# Patient Record
Sex: FEMALE | Race: WHITE | URBAN - METROPOLITAN AREA
[De-identification: names, ages, dates, MRNs, and addresses within clinical notes are randomized per-mention and may not be internally consistent; named-entity substitution may affect disease eponyms.]

---

## 2018-01-18 ENCOUNTER — OUTPATIENT (OUTPATIENT)
Dept: OUTPATIENT SERVICES | Facility: HOSPITAL | Age: 38
LOS: 1 days | End: 2018-01-18
Payer: COMMERCIAL

## 2018-01-18 VITALS
SYSTOLIC BLOOD PRESSURE: 115 MMHG | HEIGHT: 61 IN | TEMPERATURE: 98 F | DIASTOLIC BLOOD PRESSURE: 77 MMHG | RESPIRATION RATE: 15 BRPM | WEIGHT: 121.03 LBS | HEART RATE: 70 BPM

## 2018-01-18 DIAGNOSIS — Z41.9 ENCOUNTER FOR PROCEDURE FOR PURPOSES OTHER THAN REMEDYING HEALTH STATE, UNSPECIFIED: Chronic | ICD-10-CM

## 2018-01-18 DIAGNOSIS — Z01.818 ENCOUNTER FOR OTHER PREPROCEDURAL EXAMINATION: ICD-10-CM

## 2018-01-18 DIAGNOSIS — Z64.1 PROBLEMS RELATED TO MULTIPARITY: ICD-10-CM

## 2018-01-18 LAB
ALBUMIN SERPL ELPH-MCNC: 3.5 G/DL — SIGNIFICANT CHANGE UP (ref 3.3–5)
ALP SERPL-CCNC: 66 U/L — SIGNIFICANT CHANGE UP (ref 40–120)
ALT FLD-CCNC: 25 U/L — SIGNIFICANT CHANGE UP (ref 12–78)
ANION GAP SERPL CALC-SCNC: 7 MMOL/L — SIGNIFICANT CHANGE UP (ref 5–17)
AST SERPL-CCNC: 20 U/L — SIGNIFICANT CHANGE UP (ref 15–37)
BILIRUB SERPL-MCNC: 0.3 MG/DL — SIGNIFICANT CHANGE UP (ref 0.2–1.2)
BUN SERPL-MCNC: 10 MG/DL — SIGNIFICANT CHANGE UP (ref 7–23)
CALCIUM SERPL-MCNC: 8.1 MG/DL — LOW (ref 8.5–10.1)
CHLORIDE SERPL-SCNC: 106 MMOL/L — SIGNIFICANT CHANGE UP (ref 96–108)
CO2 SERPL-SCNC: 29 MMOL/L — SIGNIFICANT CHANGE UP (ref 22–31)
CREAT SERPL-MCNC: 0.5 MG/DL — SIGNIFICANT CHANGE UP (ref 0.5–1.3)
GLUCOSE SERPL-MCNC: 77 MG/DL — SIGNIFICANT CHANGE UP (ref 70–99)
HCG SERPL-ACNC: <1 MIU/ML — SIGNIFICANT CHANGE UP
HCT VFR BLD CALC: 38.3 % — SIGNIFICANT CHANGE UP (ref 34.5–45)
HGB BLD-MCNC: 12.7 G/DL — SIGNIFICANT CHANGE UP (ref 11.5–15.5)
MCHC RBC-ENTMCNC: 28.6 PG — SIGNIFICANT CHANGE UP (ref 27–34)
MCHC RBC-ENTMCNC: 33.2 GM/DL — SIGNIFICANT CHANGE UP (ref 32–36)
MCV RBC AUTO: 86.2 FL — SIGNIFICANT CHANGE UP (ref 80–100)
PLATELET # BLD AUTO: 296 K/UL — SIGNIFICANT CHANGE UP (ref 150–400)
POTASSIUM SERPL-MCNC: 3.6 MMOL/L — SIGNIFICANT CHANGE UP (ref 3.5–5.3)
POTASSIUM SERPL-SCNC: 3.6 MMOL/L — SIGNIFICANT CHANGE UP (ref 3.5–5.3)
PROT SERPL-MCNC: 7 G/DL — SIGNIFICANT CHANGE UP (ref 6–8.3)
RBC # BLD: 4.44 M/UL — SIGNIFICANT CHANGE UP (ref 3.8–5.2)
RBC # FLD: 12.9 % — SIGNIFICANT CHANGE UP (ref 10.3–14.5)
SODIUM SERPL-SCNC: 142 MMOL/L — SIGNIFICANT CHANGE UP (ref 135–145)
WBC # BLD: 8.6 K/UL — SIGNIFICANT CHANGE UP (ref 3.8–10.5)
WBC # FLD AUTO: 8.6 K/UL — SIGNIFICANT CHANGE UP (ref 3.8–10.5)

## 2018-01-18 PROCEDURE — 86850 RBC ANTIBODY SCREEN: CPT

## 2018-01-18 PROCEDURE — 86901 BLOOD TYPING SEROLOGIC RH(D): CPT

## 2018-01-18 PROCEDURE — 93005 ELECTROCARDIOGRAM TRACING: CPT

## 2018-01-18 PROCEDURE — 86900 BLOOD TYPING SEROLOGIC ABO: CPT

## 2018-01-18 PROCEDURE — G0463: CPT

## 2018-01-18 PROCEDURE — 93010 ELECTROCARDIOGRAM REPORT: CPT | Mod: NC

## 2018-01-18 PROCEDURE — 85027 COMPLETE CBC AUTOMATED: CPT

## 2018-01-18 PROCEDURE — 84702 CHORIONIC GONADOTROPIN TEST: CPT

## 2018-01-18 PROCEDURE — 80053 COMPREHEN METABOLIC PANEL: CPT

## 2018-01-18 NOTE — H&P PST ADULT - NSANTHOSAYNRD_GEN_A_CORE
No. AMANDA screening performed.  STOP BANG Legend: 0-2 = LOW Risk; 3-4 = INTERMEDIATE Risk; 5-8 = HIGH Risk

## 2018-01-25 RX ORDER — SODIUM CHLORIDE 9 MG/ML
1000 INJECTION, SOLUTION INTRAVENOUS
Qty: 0 | Refills: 0 | Status: DISCONTINUED | OUTPATIENT
Start: 2018-01-26 | End: 2018-01-26

## 2018-01-25 RX ORDER — HYDROMORPHONE HYDROCHLORIDE 2 MG/ML
0.5 INJECTION INTRAMUSCULAR; INTRAVENOUS; SUBCUTANEOUS
Qty: 0 | Refills: 0 | Status: DISCONTINUED | OUTPATIENT
Start: 2018-01-26 | End: 2018-01-26

## 2018-01-25 RX ORDER — SODIUM CHLORIDE 9 MG/ML
1000 INJECTION, SOLUTION INTRAVENOUS
Qty: 0 | Refills: 0 | Status: DISCONTINUED | OUTPATIENT
Start: 2018-01-26 | End: 2018-02-10

## 2018-01-25 RX ORDER — MEPERIDINE HYDROCHLORIDE 50 MG/ML
12.5 INJECTION INTRAMUSCULAR; INTRAVENOUS; SUBCUTANEOUS ONCE
Qty: 0 | Refills: 0 | Status: DISCONTINUED | OUTPATIENT
Start: 2018-01-26 | End: 2018-01-26

## 2018-01-26 ENCOUNTER — TRANSCRIPTION ENCOUNTER (OUTPATIENT)
Age: 38
End: 2018-01-26

## 2018-01-26 ENCOUNTER — OUTPATIENT (OUTPATIENT)
Dept: OUTPATIENT SERVICES | Facility: HOSPITAL | Age: 38
LOS: 1 days | End: 2018-01-26
Payer: COMMERCIAL

## 2018-01-26 VITALS
RESPIRATION RATE: 18 BRPM | TEMPERATURE: 99 F | HEART RATE: 78 BPM | DIASTOLIC BLOOD PRESSURE: 74 MMHG | HEIGHT: 61 IN | SYSTOLIC BLOOD PRESSURE: 128 MMHG | OXYGEN SATURATION: 100 % | WEIGHT: 121.03 LBS

## 2018-01-26 VITALS
HEART RATE: 71 BPM | OXYGEN SATURATION: 100 % | SYSTOLIC BLOOD PRESSURE: 120 MMHG | DIASTOLIC BLOOD PRESSURE: 71 MMHG | RESPIRATION RATE: 16 BRPM

## 2018-01-26 DIAGNOSIS — Z64.1 PROBLEMS RELATED TO MULTIPARITY: ICD-10-CM

## 2018-01-26 DIAGNOSIS — Z41.9 ENCOUNTER FOR PROCEDURE FOR PURPOSES OTHER THAN REMEDYING HEALTH STATE, UNSPECIFIED: Chronic | ICD-10-CM

## 2018-01-26 LAB — HCG UR QL: NEGATIVE — SIGNIFICANT CHANGE UP

## 2018-01-26 PROCEDURE — 81025 URINE PREGNANCY TEST: CPT

## 2018-01-26 PROCEDURE — 58670 LAPAROSCOPY TUBAL CAUTERY: CPT

## 2018-01-26 RX ORDER — ACETAMINOPHEN 500 MG
1000 TABLET ORAL ONCE
Qty: 0 | Refills: 0 | Status: COMPLETED | OUTPATIENT
Start: 2018-01-26 | End: 2018-01-26

## 2018-01-26 RX ADMIN — SODIUM CHLORIDE 75 MILLILITER(S): 9 INJECTION, SOLUTION INTRAVENOUS at 09:15

## 2018-01-26 NOTE — ASU DISCHARGE PLAN (ADULT/PEDIATRIC). - DRIVING
Diarrhea  Diarrhea is watery poop (stool). It can make you feel weak, tired, thirsty, or give you a dry mouth (signs of dehydration). Watery poop is a sign of another problem, most often an infection. It often lasts 2-3 days. It can last longer if it is a sign of something serious. Take care of yourself as told by your doctor.  HOME CARE   · Drink 1 cup (8 ounces) of fluid each time you have watery poop.  · Do not drink the following fluids:    Those that contain simple sugars (fructose, glucose, galactose, lactose, sucrose, maltose).    Sports drinks.    Fruit juices.    Whole milk products.    Sodas.    Drinks with caffeine (coffee, tea, soda) or alcohol.  · Oral rehydration solution may be used if the doctor says it is okay. You may make your own solution. Follow this recipe:    ?-? teaspoon table salt.    ¾ teaspoon baking soda.    ? teaspoon salt substitute containing potassium chloride.    1 ? tablespoons sugar.    1 liter (34 ounces) of water.  · Avoid the following foods:    High fiber foods, such as raw fruits and vegetables.    Nuts, seeds, and whole grain breads and cereals.     Those that are sweetened with sugar alcohols (xylitol, sorbitol, mannitol).  · Try eating the following foods:    Starchy foods, such as rice, toast, pasta, low-sugar cereal, oatmeal, baked potatoes, crackers, and bagels.    Bananas.    Applesauce.  · Eat probiotic-rich foods, such as yogurt and milk products that are fermented.  · Wash your hands well after each time you have watery poop.  · Only take medicine as told by your doctor.  · Take a warm bath to help lessen burning or pain from having watery poop.  GET HELP RIGHT AWAY IF:   · You cannot drink fluids without throwing up (vomiting).  · You keep throwing up.  · You have blood in your poop, or your poop looks black and tarry.  · You do not pee (urinate) in 6-8 hours, or there is only a small amount of very dark pee.  · You have belly (abdominal) pain that gets worse or  stays in the same spot (localizes).  · You are weak, dizzy, confused, or light-headed.  · You have a very bad headache.  · Your watery poop gets worse or does not get better.  · You have a fever or lasting symptoms for more than 2-3 days.  · You have a fever and your symptoms suddenly get worse.  MAKE SURE YOU:   · Understand these instructions.  · Will watch your condition.  · Will get help right away if you are not doing well or get worse.     This information is not intended to replace advice given to you by your health care provider. Make sure you discuss any questions you have with your health care provider.     Document Released: 06/05/2009 Document Revised: 01/08/2016 Document Reviewed: 08/23/2016  [employer] Interactive Patient Education ©2016 [employer]  Nausea and Vomiting  Nausea means you feel sick to your stomach. Throwing up (vomiting) is a reflex where stomach contents come out of your mouth.  HOME CARE   · Take medicine as told by your doctor.  · Do not force yourself to eat. However, you do need to drink fluids.  · If you feel like eating, eat a normal diet as told by your doctor.    Eat rice, wheat, potatoes, bread, lean meats, yogurt, fruits, and vegetables.    Avoid high-fat foods.  · Drink enough fluids to keep your pee (urine) clear or pale yellow.  · Ask your doctor how to replace body fluid losses (rehydrate). Signs of body fluid loss (dehydration) include:    Feeling very thirsty.    Dry lips and mouth.    Feeling dizzy.    Dark pee.    Peeing less than normal.    Feeling confused.    Fast breathing or heart rate.  GET HELP RIGHT AWAY IF:   · You have blood in your throw up.  · You have black or bloody poop (stool).  · You have a bad headache or stiff neck.  · You feel confused.  · You have bad belly (abdominal) pain.  · You have chest pain or trouble breathing.  · You do not pee at least once every 8 hours.  · You have cold, clammy skin.  · You keep throwing up after 24 to 48 hours.  · You  have a fever.  MAKE SURE YOU:   · Understand these instructions.  · Will watch your condition.  · Will get help right away if you are not doing well or get worse.     This information is not intended to replace advice given to you by your health care provider. Make sure you discuss any questions you have with your health care provider.     Document Released: 06/05/2009 Document Revised: 03/11/2013 Document Reviewed: 08/23/2016  [employer] Interactive Patient Education ©2016 [employer]  Sinusitis, Adult  Sinusitis is redness, soreness, and puffiness (inflammation) of the air pockets in the bones of your face (sinuses). The redness, soreness, and puffiness can cause air and mucus to get trapped in your sinuses. This can allow germs to grow and cause an infection.   HOME CARE   · Drink enough fluids to keep your pee (urine) clear or pale yellow.  · Use a humidifier in your home.  · Run a hot shower to create steam in the bathroom. Sit in the bathroom with the door closed. Breathe in the steam 3-4 times a day.  · Put a warm, moist washcloth on your face 3-4 times a day, or as told by your doctor.  · Use salt water sprays (saline sprays) to wet the thick fluid in your nose. This can help the sinuses drain.  · Only take medicine as told by your doctor.  GET HELP RIGHT AWAY IF:   · Your pain gets worse.  · You have very bad headaches.  · You are sick to your stomach (nauseous).  · You throw up (vomit).  · You are very sleepy (drowsy) all the time.  · Your face is puffy (swollen).  · Your vision changes.  · You have a stiff neck.  · You have trouble breathing.  MAKE SURE YOU:   · Understand these instructions.  · Will watch your condition.  · Will get help right away if you are not doing well or get worse.     This information is not intended to replace advice given to you by your health care provider. Make sure you discuss any questions you have with your health care provider.    Medication and plan of care reviewed with  patient and teaching done on med reactions/side effects.  Rest, plenty of fluids, BRAT diet and increase as tolerated, humidifier, comfort measures and follow up with your PCP if symptoms persist.  If worse in any way go to urgent care or the ER today as discussed.     Document Released: 06/05/2009 Document Revised: 01/08/2016 Document Reviewed: 10/12/2016  [employer] Interactive Patient Education ©2016 [employer].     No

## 2018-01-26 NOTE — ASU DISCHARGE PLAN (ADULT/PEDIATRIC). - NOTIFY
Unable to Urinate/Pain not relieved by Medications/Bleeding that does not stop/Persistent Nausea and Vomiting/Fever greater than 101/GYN Fever>100.4 Numbness, color, or temperature change to extremity/GYN Fever>100.4/Bleeding that does not stop/Swelling that continues/Persistent Nausea and Vomiting/Unable to Urinate/Pain not relieved by Medications

## 2018-01-26 NOTE — ASU DISCHARGE PLAN (ADULT/PEDIATRIC). - ACTIVITY LEVEL
no exercise/no douching/no intercourse/no sports/gym/nothing per vagina/no tub baths/no tampons/no heavy lifting

## 2018-01-26 NOTE — ASU DISCHARGE PLAN (ADULT/PEDIATRIC). - MEDICATION SUMMARY - MEDICATIONS TO TAKE
I will START or STAY ON the medications listed below when I get home from the hospital:    ibuprofen 600 mg oral tablet  -- 1 tab(s) by mouth every 6 hours  -- Indication: For Pain med    hydroCHLOROthiazide 25 mg oral tablet  -- 1 tab(s) by mouth once a day  -- Indication: For home med

## 2018-01-26 NOTE — BRIEF OPERATIVE NOTE - OPERATION/FINDINGS
Normal sized anteverted uterus. Omental adhesions to anterior pelvic wall. Normal appearing bilateral tubes and ovaries.

## 2018-01-26 NOTE — BRIEF OPERATIVE NOTE - PROCEDURE
<<-----Click on this checkbox to enter Procedure Tubal ligation or cauterization, laparoscopic  01/26/2018  bilateral tubal ligation  Active  [email or web address]

## 2018-02-09 ENCOUNTER — TRANSCRIPTION ENCOUNTER (OUTPATIENT)
Age: 38
End: 2018-02-09

## 2018-07-17 PROBLEM — I10 ESSENTIAL (PRIMARY) HYPERTENSION: Chronic | Status: ACTIVE | Noted: 2018-01-18

## 2018-10-02 ENCOUNTER — EMERGENCY (EMERGENCY)
Facility: HOSPITAL | Age: 38
LOS: 1 days | End: 2018-10-02
Attending: EMERGENCY MEDICINE
Payer: MEDICAID

## 2018-10-02 VITALS
DIASTOLIC BLOOD PRESSURE: 79 MMHG | OXYGEN SATURATION: 100 % | HEART RATE: 70 BPM | RESPIRATION RATE: 16 BRPM | SYSTOLIC BLOOD PRESSURE: 112 MMHG

## 2018-10-02 VITALS
OXYGEN SATURATION: 100 % | WEIGHT: 121.03 LBS | HEART RATE: 89 BPM | RESPIRATION RATE: 14 BRPM | DIASTOLIC BLOOD PRESSURE: 82 MMHG | TEMPERATURE: 99 F | SYSTOLIC BLOOD PRESSURE: 121 MMHG

## 2018-10-02 DIAGNOSIS — Z41.9 ENCOUNTER FOR PROCEDURE FOR PURPOSES OTHER THAN REMEDYING HEALTH STATE, UNSPECIFIED: Chronic | ICD-10-CM

## 2018-10-02 PROCEDURE — 99283 EMERGENCY DEPT VISIT LOW MDM: CPT | Mod: 25

## 2018-10-02 PROCEDURE — 99283 EMERGENCY DEPT VISIT LOW MDM: CPT

## 2018-10-02 NOTE — ED PROVIDER NOTE - MEDICAL DECISION MAKING DETAILS
episode of anxiety, sob, ekg normal, hx of anxiety.  symptoms resolved given information for psychiatrist.

## 2018-10-02 NOTE — ED PROVIDER NOTE - OBJECTIVE STATEMENT
38 y female presents with episode of feeling sob, anxious during a class she was attending, states recently her grandmother moved in with her, has more stress in her life.  states in the past she has had several similar episodes,  feeling better now, no sob.  has hx of allergies, takes claritin.  states she has seen a therapist in the past, he retired.  occasional smoker, occasional etoh. PMH: htn, allergies.  PMd Dr Cárdenas  requested name of new therapist/psychiatrist , does not want to take medications for her symptoms

## 2018-10-02 NOTE — ED PROVIDER NOTE - ATTENDING CONTRIBUTION TO CARE
37 yo F with hx HTN pw has been under tremendous stress lately and states has been coming to a head. Tonight during class pt became anxious, then co mild dyspnea and slight headache. Lasted a few min and then resolved. No palpitations / feeling of heart racing. No numb/ting/focal weak. no chest pain. No abd pain. No fall / trauma. No recent BENSON / easy fatigue. No recent travel / immob. No agg/allev factors. No other inj or co.  Exam with [facility]MM , non-toxic, well appearing. neck supple. no meningeal signs. Lungs cta bl, no w/r/r. nl cardiac exam. Pt happy / smiling. No feeling of overwhelmed.  EKG with no acute findings.   Pt will fu with PMD as outpt . Dw pt re sob prec /inst, risk of occult path and need for fu with PMD for definitive diag / management.

## 2018-10-02 NOTE — ED ADULT NURSE NOTE - OBJECTIVE STATEMENT
patient states she has hx of anxiety and felt like she was having sob the same way she did In the past when she was on a plane and became anxious. denies chest pain, states she also takes medications for seasonal allergies and has been experiencing nasal congestion which makes it difficult to breathe through her nose at times. Patinet well appearing, no visible SOB, in no distress.

## 2018-10-02 NOTE — ED ADULT TRIAGE NOTE - CHIEF COMPLAINT QUOTE
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

## 2018-10-02 NOTE — ED ADULT NURSE NOTE - NSIMPLEMENTINTERV_GEN_ALL_ED
Implemented All Universal Safety Interventions:  [place] to call system. Call bell, personal items and telephone within reach. Instruct patient to call for assistance. Room bathroom lighting operational. Non-slip footwear when patient is off stretcher. Physically safe environment: no spills, clutter or unnecessary equipment. Stretcher in lowest position, wheels locked, appropriate side rails in place.

## 2019-10-16 ENCOUNTER — TRANSCRIPTION ENCOUNTER (OUTPATIENT)
Age: 39
End: 2019-10-16

## 2020-01-07 ENCOUNTER — TRANSCRIPTION ENCOUNTER (OUTPATIENT)
Age: 40
End: 2020-01-07

## 2020-01-31 ENCOUNTER — TRANSCRIPTION ENCOUNTER (OUTPATIENT)
Age: 40
End: 2020-01-31

## 2020-03-16 ENCOUNTER — TRANSCRIPTION ENCOUNTER (OUTPATIENT)
Age: 40
End: 2020-03-16

## 2020-04-05 ENCOUNTER — TRANSCRIPTION ENCOUNTER (OUTPATIENT)
Age: 40
End: 2020-04-05

## 2020-06-20 ENCOUNTER — TRANSCRIPTION ENCOUNTER (OUTPATIENT)
Age: 40
End: 2020-06-20

## 2021-03-25 ENCOUNTER — APPOINTMENT (OUTPATIENT)
Dept: ORTHOPEDIC SURGERY | Facility: CLINIC | Age: 41
End: 2021-03-25
Payer: MEDICAID

## 2021-03-25 VITALS — BODY MASS INDEX: 25.49 KG/M2 | HEIGHT: 61 IN | WEIGHT: 135 LBS

## 2021-03-25 DIAGNOSIS — M79.645 PAIN IN LEFT FINGER(S): ICD-10-CM

## 2021-03-25 DIAGNOSIS — M79.644 PAIN IN RIGHT FINGER(S): ICD-10-CM

## 2021-03-25 PROCEDURE — 73110 X-RAY EXAM OF WRIST: CPT | Mod: RT

## 2021-03-25 PROCEDURE — 99203 OFFICE O/P NEW LOW 30 MIN: CPT

## 2021-03-25 PROCEDURE — 99072 ADDL SUPL MATRL&STAF TM PHE: CPT

## 2021-03-25 RX ORDER — MELOXICAM 7.5 MG/1
7.5 TABLET ORAL DAILY
Qty: 30 | Refills: 6 | Status: ACTIVE | COMMUNITY
Start: 2021-03-25 | End: 1900-01-01

## 2021-04-02 ENCOUNTER — NON-APPOINTMENT (OUTPATIENT)
Age: 41
End: 2021-04-02

## 2021-04-02 ENCOUNTER — APPOINTMENT (OUTPATIENT)
Dept: OTOLARYNGOLOGY | Facility: CLINIC | Age: 41
End: 2021-04-02
Payer: MEDICAID

## 2021-04-02 VITALS
SYSTOLIC BLOOD PRESSURE: 109 MMHG | HEART RATE: 74 BPM | DIASTOLIC BLOOD PRESSURE: 79 MMHG | HEIGHT: 61 IN | WEIGHT: 132 LBS | TEMPERATURE: 98.2 F | BODY MASS INDEX: 24.92 KG/M2

## 2021-04-02 DIAGNOSIS — R68.89 OTHER GENERAL SYMPTOMS AND SIGNS: ICD-10-CM

## 2021-04-02 DIAGNOSIS — H61.22 IMPACTED CERUMEN, LEFT EAR: ICD-10-CM

## 2021-04-02 PROCEDURE — 99204 OFFICE O/P NEW MOD 45 MIN: CPT | Mod: 25

## 2021-04-02 PROCEDURE — 31231 NASAL ENDOSCOPY DX: CPT

## 2021-04-02 PROCEDURE — 99072 ADDL SUPL MATRL&STAF TM PHE: CPT

## 2021-04-02 PROCEDURE — 69210 REMOVE IMPACTED EAR WAX UNI: CPT

## 2021-04-02 RX ORDER — FLUTICASONE PROPIONATE 50 UG/1
50 SPRAY, METERED NASAL DAILY
Qty: 3 | Refills: 3 | Status: ACTIVE | COMMUNITY
Start: 2021-04-02 | End: 1900-01-01

## 2021-04-02 RX ORDER — OXYMETAZOLINE HCL 0.05 %
0.05 SPRAY, NON-AEROSOL (ML) NASAL
Qty: 1 | Refills: 0 | Status: ACTIVE | COMMUNITY
Start: 2021-04-02 | End: 1900-01-01

## 2021-04-02 RX ORDER — PREDNISONE 10 MG/1
10 TABLET ORAL
Qty: 27 | Refills: 0 | Status: ACTIVE | COMMUNITY
Start: 2021-04-02 | End: 1900-01-01

## 2021-04-02 RX ORDER — AMOXICILLIN AND CLAVULANATE POTASSIUM 875; 125 MG/1; MG/1
875-125 TABLET, COATED ORAL
Qty: 28 | Refills: 0 | Status: ACTIVE | COMMUNITY
Start: 2021-04-02 | End: 1900-01-01

## 2021-04-02 RX ORDER — HYDROCHLOROTHIAZIDE 12.5 MG/1
12.5 CAPSULE ORAL
Refills: 0 | Status: ACTIVE | COMMUNITY

## 2021-04-02 RX ORDER — LORATADINE 5 MG/5 ML
10 SOLUTION, ORAL ORAL
Refills: 0 | Status: ACTIVE | COMMUNITY

## 2021-04-02 NOTE — PROCEDURE
[FreeTextEntry3] : Procedure- Removal of cerumen left\par Diagnosis - Left cerumen impaction\par Left ear found to have impacted cerumen - it was cleared with suction and curette, canal appeared normal.\par  [FreeTextEntry6] : Procedure performed: Nasal Endoscopy- Diagnostic\par Pre-op indication(s): nasal congestion\par Post-op indication(s): nasal congestion \par Verbal and/or written consent obtained from patient\par Anterior rhinoscopy insufficient to account for symptoms\par Scope #: 3,  flexible fiber optic telescope \par The scope was introduced in the nasal passage between the middle and inferior turbinates to exam the inferior portion of the middle meatus and the fontanelle, as well as the maxillary ostia.  Next, the scope was passed medically and posteriorly to the middle turbinates to examine the sphenoethmoid recess and the superior turbinate region.\par Upon visualization the finders are as follows:\par Nasal Septum: right septal deviation\par Bilateral - Mucosa: boggy turbinates, Mucous: scant, Polyp: not seen, Inferior Turbinate: boggy, Middle Turbinate: b/l BITH, Superior Turbinate: normal, Inferior Meatus: narrow, Middle Meatus: narrow, Super Meatus:normal, Sphenoethmoidal Recess: clear\par  [de-identified] : Procedure performed: laryngeal Endoscopy- Diagnostic\par Pre-op/post op indication: dysphonia\par Verbal and/or written consent obtained from patient, Patient was unable to cooperate with mirror\par Scope #: 3, flexible fiber optic telescope used \par Scope was introduced through the nose passed on the floor of the nose to the nasopharynx and then followed down the soft palate to the lower pharynx. The tongue Base, Larynx, Hypopharynx were examined. Base of tongue was symmetric, vallecular was clear, epiglottis was not deformed, subglottis/ pyriform and posterior pharyngeal walls were clear. +mild acid reflux, + erythema, edema, pooling of secretions, masses or lesions. Airway patent, no foreign body visualized. No glottic/supraglottic edema. True vocal cords, arytenoids, vestibular folds, ventricles, pyriform sinuses, and aryepiglottic folds appear normal bilaterally. Vocal cords mobile with good contact b/l.\par \par

## 2021-04-02 NOTE — REVIEW OF SYSTEMS
[Seasonal Allergies] : seasonal allergies [Post Nasal Drip] : post nasal drip [Ear Pain] : ear pain [Ear Itch] : ear itch [Hearing Loss] : hearing loss [Nasal Congestion] : nasal congestion [Sinus Pain] : sinus pain [Sinus Pressure] : sinus pressure [Hoarseness] : hoarseness [Throat Clearing] : throat clearing [Throat Dryness] : throat dryness [Throat Itching] : throat itching [Eye Pain] : eye pain [Red Eyes] : red eyes [Eyes Itch] : itching of the eyes [Negative] : Heme/Lymph [de-identified] : clogged

## 2021-04-02 NOTE — HISTORY OF PRESENT ILLNESS
[de-identified] : 39 y/o F c/o chronic sinus pressure and infections that has been going on for about 9 years. Sinus pressure is primarily around her cheeks and eye area. \par Pt not sure how many sinus infections she gets as she wont even know sometimes she has an an infection. Last time she went to PMD who stated she had a sinus infection which passed. \par Pt never received abx or steroids for this \par no CT done  \par +constant b/l nasal congestion- will use Flonase because its very seasonal \par +runny nose \par but will do Claritin D everyday \par tested for allergies- seasonal allergies to trees, to cats \par \par \par pt also with throat clearing and itchy throat x 9 years, worse in the morning. \par +dysphonia for 9 years\par not on any aicd reflux medication \par pt denies dysphagia, not a smoker or drinker

## 2021-04-02 NOTE — END OF VISIT
[FreeTextEntry3] : I personally saw and examined SHARON CHU in detail. I spoke to ALETHA Delatorre regarding the assessment and plan of care.  I preformed the procedures and I reviewed the above assessment and plan of care, and agree. I have made changes in changes in the body of the note where appropriate.\par \par

## 2021-04-02 NOTE — REASON FOR VISIT
[Initial Consultation] : an initial consultation for [Sinusitis] : sinusitis [FreeTextEntry2] : chronic sinus infections

## 2021-04-02 NOTE — ASSESSMENT
[FreeTextEntry1] : Pt with sinus pressure and Nasal congestion with right septal deviation and bilateral turbinate hypertrophy. Will start regiment to see if may improve symptoms and escalate if needed \par - Will start Flonase. A topical steroid reduce mucosal swelling, illustrated appropriate use and how to reduce the risk of bleeding \par - Nasal irrigation and showed how to use it to maximize effectiveness \par - We will proceed with a regiment of decongestants, antibiotics and irrigation to try to break the cycle of inflation and obstruction. this will treat the acute symptoms and will hopefully allow for maintenance therapy to reach disease areas and avoid further intervention.\par will need to get off claritin D \par \par \par pt also with throat clearing with mild acid reflux on exam \par - will proceed to start lifestyle regiment to reduce overproduction of acid and reduce laryngeal reflux including avoiding caffein, alcohol, eating before bed, spicy and fatty foods, and head elevation at night etc. Handout detailing regiment also given\par \par \par \par Pt with L CI \par L ear cleaned \par avoid q tips \par ear hygiene\par discussed preventive measures and signs of accumulation\par

## 2021-04-07 RX ORDER — FLUCONAZOLE 150 MG/1
150 TABLET ORAL
Qty: 2 | Refills: 0 | Status: ACTIVE | COMMUNITY
Start: 2021-04-07 | End: 1900-01-01

## 2021-05-21 ENCOUNTER — APPOINTMENT (OUTPATIENT)
Dept: OTOLARYNGOLOGY | Facility: CLINIC | Age: 41
End: 2021-05-21
Payer: MEDICAID

## 2021-05-21 VITALS
HEART RATE: 74 BPM | WEIGHT: 129 LBS | TEMPERATURE: 98.3 F | DIASTOLIC BLOOD PRESSURE: 79 MMHG | HEIGHT: 61 IN | BODY MASS INDEX: 24.35 KG/M2 | SYSTOLIC BLOOD PRESSURE: 109 MMHG

## 2021-05-21 DIAGNOSIS — K21.9 GASTRO-ESOPHAGEAL REFLUX DISEASE W/OUT ESOPHAGITIS: ICD-10-CM

## 2021-05-21 DIAGNOSIS — J30.2 OTHER SEASONAL ALLERGIC RHINITIS: ICD-10-CM

## 2021-05-21 DIAGNOSIS — J00 ACUTE NASOPHARYNGITIS [COMMON COLD]: ICD-10-CM

## 2021-05-21 DIAGNOSIS — J32.4 CHRONIC PANSINUSITIS: ICD-10-CM

## 2021-05-21 PROCEDURE — 99213 OFFICE O/P EST LOW 20 MIN: CPT | Mod: 25

## 2021-05-21 PROCEDURE — 31231 NASAL ENDOSCOPY DX: CPT

## 2021-05-21 PROCEDURE — 99072 ADDL SUPL MATRL&STAF TM PHE: CPT

## 2021-05-21 NOTE — PROCEDURE
[FreeTextEntry6] : Procedure performed: Nasal Endoscopy- Diagnostic\par Pre-op indication(s): nasal congestion\par Post-op indication(s): nasal congestion \par Verbal and/or written consent obtained from patient\par Anterior rhinoscopy insufficient to account for symptoms\par Scope #: 3,  flexible fiber optic telescope \par The scope was introduced in the nasal passage between the middle and inferior turbinates to exam the inferior portion of the middle meatus and the fontanelle, as well as the maxillary ostia.  Next, the scope was passed medically and posteriorly to the middle turbinates to examine the sphenoethmoid recess and the superior turbinate region.\par Upon visualization the finders are as follows:\par Nasal Septum: right septal deviation\par Bilateral - Mucosa: boggy turbinates, Mucous: scant, Polyp: not seen, Inferior Turbinate: boggy, Middle Turbinate: mild b/l BITH, Superior Turbinate: normal, Inferior Meatus: narrow, Middle Meatus: narrow, Super Meatus:normal, Sphenoethmoidal Recess: clear\par  [de-identified] : Procedure performed: laryngeal Endoscopy- Diagnostic\par Pre-op/post op indication: dysphonia\par Verbal and/or written consent obtained from patient, Patient was unable to cooperate with mirror\par Scope #: 3, flexible fiber optic telescope used \par Scope was introduced through the nose passed on the floor of the nose to the nasopharynx and then followed down the soft palate to the lower pharynx. The tongue Base, Larynx, Hypopharynx were examined. Base of tongue was symmetric, vallecular was clear, epiglottis was not deformed, subglottis/ pyriform and posterior pharyngeal walls were clear. +mild acid reflux, + erythema, edema, pooling of secretions, masses or lesions. Airway patent, no foreign body visualized. No glottic/supraglottic edema. True vocal cords, arytenoids, vestibular folds, ventricles, pyriform sinuses, and aryepiglottic folds appear normal bilaterally. Vocal cords mobile with good contact b/l.\par \par

## 2021-05-21 NOTE — ASSESSMENT
[FreeTextEntry1] : Pt with sinus pressure and Nasal congestion with right septal deviation and bilateral turbinate hypertrophy. Will start regiment to see if may improve symptoms and escalate if needed \par - continue Flonase. A topical steroid reduce mucosal swelling, illustrated appropriate use and how to reduce the risk of bleeding \par - Nasal irrigation and showed how to use it to maximize effectiveness \par - has HTN, should avoid long term claritin D \par - discussed options of Tx, she feels she had some improvement so will hold off on surgery for now \par \par \par pt also with throat clearing with mild acid reflux on exam \par - will proceed to start lifestyle regiment to reduce overproduction of acid and reduce laryngeal reflux including avoiding caffein, alcohol, eating before bed, spicy and fatty foods, and head elevation at night etc. Handout detailing regiment also given\par \par \par

## 2021-05-21 NOTE — HISTORY OF PRESENT ILLNESS
[de-identified] : 39 y/o F c/o chronic sinus pressure and infections that has been going on for about 9 years. Sinus pressure is primarily around her cheeks and eye area. \par Pt not sure how many sinus infections she gets as she wont even know sometimes she has an an infection. Last time she went to PMD who stated she had a sinus infection which passed. \par Pt never received abx or steroids for this \par no CT done  \par +constant b/l nasal congestion- will use Flonase because its very seasonal \par +runny nose \par but will do Claritin D everyday \par tested for allergies- seasonal allergies to trees, to cats \par \par \par pt also with throat clearing and itchy throat x 9 years, worse in the morning. \par +dysphonia for 9 years\par not on any aicd reflux medication \par pt denies dysphagia, not a smoker or drinker  [FreeTextEntry1] : Pt s/p sinusitis regimen feels sinus pressure is gone, and she no longer has it. Pt feels now just with allergies, her sxs consist of runny nose, and drip down her throat. \par pt doing precautions for LPRD. \par pt is currently doing flonase, and Claritin D everyday. \par pt denies nasal congestion and is able to breathe through her nose

## 2021-05-21 NOTE — CONSULT LETTER
[Please see my note below.] : Please see my note below. [FreeTextEntry1] : Dear Dr. SHIRA QUEEN \par I had the pleasure of evaluating your patient SHARON CUH, thank you for allowing us to participate in their care. please see full note detailing our visit below.\par If you have any questions, please do not hesitate to call me and I would be happy to discuss further. \par \par Reese Fontenot M.D.\par Attending Physician,  \par Department of Otolaryngology - Head and Neck Surgery\par [facility] \par Office: [phone]\par Fax: [phone]\par \par \par \par \par

## 2021-05-21 NOTE — REASON FOR VISIT
[Sinusitis] : sinusitis [Subsequent Evaluation] : a subsequent evaluation for [FreeTextEntry2] : chronic sinus infections

## 2021-05-21 NOTE — REVIEW OF SYSTEMS
[Seasonal Allergies] : seasonal allergies [Post Nasal Drip] : post nasal drip [Throat Clearing] : throat clearing [Eye Pain] : eye pain [Red Eyes] : red eyes [Eyes Itch] : itching of the eyes [Negative] : Heme/Lymph [As Noted in HPI] : as noted in HPI [Ear Itch] : no ear itch [Hearing Loss] : no hearing loss [Nasal Congestion] : no nasal congestion [Sinus Pain] : no sinus pain [Sinus Pressure] : no sinus pressure [Hoarseness] : no hoarseness [Throat Dryness] : no throat dryness [Throat Itching] : no throat itching [de-identified] : clogged

## 2022-02-15 ENCOUNTER — TRANSCRIPTION ENCOUNTER (OUTPATIENT)
Age: 42
End: 2022-02-15

## 2022-03-24 ENCOUNTER — TRANSCRIPTION ENCOUNTER (OUTPATIENT)
Age: 42
End: 2022-03-24

## 2022-03-31 ENCOUNTER — TRANSCRIPTION ENCOUNTER (OUTPATIENT)
Age: 42
End: 2022-03-31

## 2022-04-12 ENCOUNTER — EMERGENCY (EMERGENCY)
Facility: HOSPITAL | Age: 42
LOS: 0 days | Discharge: HOME | End: 2022-04-13
Attending: EMERGENCY MEDICINE
Payer: MEDICAID

## 2022-04-12 VITALS
HEIGHT: 61 IN | RESPIRATION RATE: 18 BRPM | WEIGHT: 134.04 LBS | OXYGEN SATURATION: 100 % | SYSTOLIC BLOOD PRESSURE: 126 MMHG | HEART RATE: 74 BPM | TEMPERATURE: 99 F | DIASTOLIC BLOOD PRESSURE: 87 MMHG

## 2022-04-12 DIAGNOSIS — Z41.9 ENCOUNTER FOR PROCEDURE FOR PURPOSES OTHER THAN REMEDYING HEALTH STATE, UNSPECIFIED: Chronic | ICD-10-CM

## 2022-04-12 DIAGNOSIS — J02.9 ACUTE PHARYNGITIS, UNSPECIFIED: ICD-10-CM

## 2022-04-12 DIAGNOSIS — I10 ESSENTIAL (PRIMARY) HYPERTENSION: ICD-10-CM

## 2022-04-12 DIAGNOSIS — R05.9 COUGH, UNSPECIFIED: ICD-10-CM

## 2022-04-12 PROCEDURE — 99283 EMERGENCY DEPT VISIT LOW MDM: CPT

## 2022-04-13 RX ORDER — IBUPROFEN 200 MG
400 TABLET ORAL ONCE
Refills: 0 | Status: COMPLETED | OUTPATIENT
Start: 2022-04-13 | End: 2022-04-13

## 2022-04-13 RX ADMIN — Medication 400 MILLIGRAM(S): at 00:28

## 2022-04-13 NOTE — ED STATDOCS - NSFOLLOWUPINSTRUCTIONS_ED_ALL_ED_FT
Ibuprofen as needed for sore throat  Anything worsens or persists, return to ER for further care and evaluation.    Pharyngitis    WHAT YOU NEED TO KNOW:    Pharyngitis, or sore throat, is inflammation of the tissues and structures in your pharynx (throat). Pharyngitis is most often caused by bacteria. It may also be caused by a cold or flu virus. Other causes include smoking, allergies, or acid reflux.     DISCHARGE INSTRUCTIONS:    Call 911 for any of the following:     You have trouble breathing or swallowing because your throat is swollen or sore.        Return to the emergency department if:     You are drooling because it hurts too much to swallow.      Your fever is higher than 102°F (39°C) or lasts longer than 3 days.      You are confused.      You taste blood in your throat.    Contact your healthcare provider if:     Your throat pain gets worse.      You have a painful lump in your throat that does not go away after 5 days.      Your symptoms do not improve after 5 days.      You have questions or concerns about your condition or care.    Medicines: Viral pharyngitis will go away on its own without treatment. Your sore throat should start to feel better in 3 to 5 days for both viral and bacterial infections. You may need any of the following:     Antibiotics treat a bacterial infection.      NSAIDs, such as ibuprofen, help decrease swelling, pain, and fever. NSAIDs can cause stomach bleeding or kidney problems in certain people. If you take blood thinner medicine, always ask your healthcare provider if NSAIDs are safe for you. Always read the medicine label and follow directions.      Acetaminophen decreases pain and fever. It is available without a doctor's order. Ask how much to take and how often to take it. Follow directions. Acetaminophen can cause liver damage if not taken correctly.      Take your medicine as directed. Contact your healthcare provider if you think your medicine is not helping or if you have side effects. Tell him or her if you are allergic to any medicine. Keep a list of the medicines, vitamins, and herbs you take. Include the amounts, and when and why you take them. Bring the list or the pill bottles to follow-up visits. Carry your medicine list with you in case of an emergency.    Manage your symptoms:     Gargle salt water. Mix ¼ teaspoon salt in an 8 ounce glass of warm water and gargle. This may help decrease swelling in your throat.      Drink liquids as directed. You may need to drink more liquids than usual. Liquids may help soothe your throat and prevent dehydration. Ask how much liquid to drink each day and which liquids are best for you.      Use a cool-steam humidifier to help moisten the air in your room and calm your cough.      Soothe your throat with cough drops, ice, soft foods, or popsicles.    Prevent the spread of pharyngitis: Cover your mouth and nose when you cough or sneeze. Do not share food or drinks. Wash your hands often. Use soap and water. If soap and water are unavailable, use an alcohol based hand .     Follow up with your healthcare provider as directed: Write down your questions so you remember to ask them during your visits.

## 2022-04-13 NOTE — ED STATDOCS - PATIENT PORTAL LINK FT
You can access the FollowMyHealth Patient Portal offered by [facility] by registering at the following website: [email or web address]. By joining [facility]’s FollowMyHealth portal, you will also be able to view your health information using other applications (apps) compatible with our system.

## 2022-04-13 NOTE — ED STATDOCS - NSICDXPASTSURGICALHX_GEN_ALL_CORE_FT
PAST SURGICAL HISTORY:  Elective surgery C section   X 1  2015    Elective surgery breast implants  2013

## 2022-04-13 NOTE — ED ADULT NURSE NOTE - OBJECTIVE STATEMENT
pt presents to the ED with sore throat and cough. pt states that he son is at [facility]

## 2022-04-13 NOTE — ED ADULT NURSE NOTE - COVID-19 RESULT
Problem: Pain  Goal: #Acceptable pain level achieved/maintained at rest using NRS/Faces  This goal is used for patients who can self-report.  Acceptable means the level is at or below the identified comfort/function goal.  Outcome: Outcome Met, Continue evaluating goal progress toward completion  No complaint of pain     Problem: Impaired Physical Mobility  Goal: # Bed mobility, ambulation, and ADLs are maintained or returned to baseline during hospitalization  Outcome: Outcome Met, Continue evaluating goal progress toward completion  Ambulating independently at baseline. With walker        NEGATIVE

## 2022-04-13 NOTE — ED STATDOCS - OBJECTIVE STATEMENT
ST x tonight in the setting of her son having URI with cough.  started getting a ST, sprayed with chloraseptic and then throat became very irritated, felt like it was closing and she could not breathe.  feels better now after drinking water.

## 2022-10-20 ENCOUNTER — NON-APPOINTMENT (OUTPATIENT)
Age: 42
End: 2022-10-20

## 2023-06-17 NOTE — ED STATDOCS - CLINICAL SUMMARY MEDICAL DECISION MAKING FREE TEXT BOX
ST x tonight, inflammed after throat spray, now better. Supportive measures and return precautions discussed. Negative

## 2024-08-24 NOTE — H&P PST ADULT - FUNCTIONAL LEVEL PRIOR: BATHING
Operative Note    Date of Dictation: 08/24/24    Date of Procedure: 08/22/2020    Referring Physician: Carroll Lopez MD    Preoperative diagnosis:  1.  5 cm ascending aortic aneurysm with ascending to descending aorta index greater than 2  2.  Mild to moderate aortic insufficiency with an eccentric jet  3.  Bicuspid aortopathy    Postoperative diagnosis:  Same    Procedure:   1.  Elective ascending aortic aneurysm repair with a 30 mm Gelweave Dacron interposition graft  2.  Aortic valve repair with plication of the conjoined leaflet, release of the raphae of the same leaflet and narrowing of the sinotubular junction symmetrically to 30 mm  3.  Left atrial appendage epicardial closure with a 35 mm atrial clip device    Surgeon: Carlos Alex MD     Assistants: Assistant: Jenae Balderrama PA was responsible for performing the following activities: Retraction, Suction, Irrigation, Suturing, Closing, Placing Dressing, and all aspects of the complex cardiac case  and their skilled assistance was necessary for the success of this case.    Anesthesia: General endotracheal anesthesia and CHACE    Findings:  Dysmorphic bicuspid aortic valve with cleft between the right and left conjoined leaflet and mild dilatation of the aortic root.  Tapering of the aneurysm    Estimated Blood Loss: Approximately 400 cc, most of it recovered with a Cell Saver device and cardiotomy suckers and was retransfused to the patient    STS Data:  The patient was explained the risks and benefits and alternatives of surgery and agreed to proceed.  The STS risk was calculated for aortic valve repair or replacement.  Antibiotics and beta-blockers were given within the STS required window.  Counseling was given about diet, alcohol and tobacco use as needed        Description of the procedure:     The patient was placed supine on the operative table. Anesthesia was given and lines placed. The patient was prepped and draped using the usual  sterile technique. A median sternotomy was performed with a scalpel and the layers carried down to the sternum using the electrocautery. The sternum was split in the midline using a vertical oscillating saw. Hemostasis was achieved.  A  favaloro retractor was placed and anterior mediastinal was exposed the pericardium was opened and the edges were tacked to the wound.  Of note, upon opening the sternum the patient became bradycardic requiring quick open of the pericardium and external pacing.  I explored the distal ascending aorta and there was narrowing and tapering up to 1 cm below the takeoff of the innominate artery therefore circulatory arrest was not needed to complete the distal anastomosis.  300 units of IV heparin was given to maintain the ACT over 400.  Cannulation sutures were placed in the mid aortic arch and right atrium. Cardiopulmonary bypass was started and the plaque cannulas were placed.  I placed the ascending aortic cross-clamp incorporating part of the innominate artery to allow a more distal anastomosis..  One liter of cold blood cardioplegia was given in an antegrade fashion to achieve diastolic arrest and further doses every 15 minutes thereafter also using retrograde infusion and coronary ostia cannula in the right.  The heart was retracted and expose the base of the left atrial appendage.  I measured to 35 mm and I selected atrial clip device 35 mm which was released without difficulty with nice closure of the appendage.  Following, the aorta was transected in the mid portion and the root was examined.  There was dilatation of the root to a diameter present 3.8 cm.  In my opinion it did not need to be replaced.  The aortic valve was bicuspid with mild fibrosis in the clefted conjoin leaflet.  Using tenotomy scissors I release the fibrotic raphae to allow the leaflet to accommodate better and then I used 5-0 Prolene suture with small needles to close the cleft in the leaflet.  I measured the  length of both free edges of the leaflets and the both match and then I perform a water test that was perfectly continent.  I selected a 30 mm dacryon graft and marked it symmetrically in 3 corners to match symmetrically the sinotubular junction which was reduced to 30 mm.  I used a 4-0 Prolene continuous suture and a thin strip of Eliazar felt and a complete anastomosis intussuscepting the graft inside the root.  I used a nerve hook to tensed the suture line and then tied down and placed a few repair sutures posteriorly to prevent leakage.  I resected the ascending aorta to 1 cm below the distal clamp and completed anastomosis between the graft which was trimmed to conform the normal curvatures of the aorta and to the distal aortic cuff.  I used a 4-0 Prolene continuous suture and complete anastomosis without difficulty.  After a few repair sutures I placed an antegrade cardioplegia cannula and vent in the mid ascending aortic graft, I gave the warm dose of cardioplegia and then I removed the aortic clamp with steep suction in the graft vent.  I allowed time for rewarming.  The left pleural space was suctioned and the lungs ventilated. The heart was paced till regular atrial rhythm resumed. I allowed the heart to eject  and I de-aired the left heart chambers under CHACE guidance and once hemodynamics were acceptable, then the CPB was discontinued and the venous and cardioplegia cannulas removed. The matching dose of protamine was given and the aortic cannula removed as well. AV temporary wires and pleural and mediastinal chest tubes were placed and the wound sprayed with platelet rich plasma.  The perioperative CHACE showed the without leaflet prolapse and without aortic insufficiency or whatsoever.  The preoperative echocardiogram showed mild close to moderate AI and a strong mild with low pressure in the operating room. The sternum was closed with single and double wires and soft tissue in layers of reabsorbable  material. The wounds were covered with sterile dressings.    Specimen removed: Proximal thoracic aorta tissue    CPB time and Aortic clamp times: Documented in the perfusion record       Complications:  none           Disposition: Cardiovascular recovery room           Condition: Critical but stable.    Carlos Alex M.D.     (0) independent

## 2024-10-23 NOTE — H&P PST ADULT - LYMPH NODES
For information on Fall & Injury Prevention, visit: [email or web address] OR  [email or web address] OR  https://www.cdc.gov/steadi/patient.html not examined

## 2025-04-30 ENCOUNTER — APPOINTMENT (OUTPATIENT)
Dept: GASTROENTEROLOGY | Facility: CLINIC | Age: 45
End: 2025-04-30

## 2025-06-11 ENCOUNTER — APPOINTMENT (OUTPATIENT)
Dept: GASTROENTEROLOGY | Facility: CLINIC | Age: 45
End: 2025-06-11
Payer: MEDICAID

## 2025-06-11 VITALS
HEART RATE: 90 BPM | HEIGHT: 61 IN | TEMPERATURE: 98 F | BODY MASS INDEX: 25.68 KG/M2 | WEIGHT: 136 LBS | OXYGEN SATURATION: 99 % | DIASTOLIC BLOOD PRESSURE: 72 MMHG | SYSTOLIC BLOOD PRESSURE: 118 MMHG

## 2025-06-11 PROBLEM — R10.13 EPIGASTRIC PAIN: Status: ACTIVE | Noted: 2025-06-11

## 2025-06-11 LAB
HCT VFR BLD CALC: 35.3 %
HGB BLD-MCNC: 11 G/DL
MCHC RBC-ENTMCNC: 24.8 PG
MCHC RBC-ENTMCNC: 31.2 G/DL
MCV RBC AUTO: 79.7 FL
PLATELET # BLD AUTO: 384 K/UL
RBC # BLD: 4.43 M/UL
RBC # FLD: 15 %
WBC # FLD AUTO: 10.79 K/UL

## 2025-06-11 PROCEDURE — 99204 OFFICE O/P NEW MOD 45 MIN: CPT

## 2025-06-11 RX ORDER — OMEPRAZOLE 40 MG/1
40 CAPSULE, DELAYED RELEASE ORAL DAILY
Qty: 30 | Refills: 1 | Status: ACTIVE | COMMUNITY
Start: 2025-06-11 | End: 1900-01-01

## 2025-06-12 LAB
ALBUMIN SERPL ELPH-MCNC: 4.1 G/DL
ALP BLD-CCNC: 88 U/L
ALT SERPL-CCNC: 14 U/L
AMYLASE/CREAT SERPL: 61 U/L
ANION GAP SERPL CALC-SCNC: 14 MMOL/L
AST SERPL-CCNC: 18 U/L
BILIRUB SERPL-MCNC: 0.2 MG/DL
BUN SERPL-MCNC: 12 MG/DL
CALCIUM SERPL-MCNC: 9 MG/DL
CHLORIDE SERPL-SCNC: 103 MMOL/L
CO2 SERPL-SCNC: 20 MMOL/L
CREAT SERPL-MCNC: 0.55 MG/DL
EGFRCR SERPLBLD CKD-EPI 2021: 116 ML/MIN/1.73M2
GLUCOSE SERPL-MCNC: 111 MG/DL
IGA SERPL-MCNC: 158 MG/DL
LPL SERPL-CCNC: 55 U/L
POTASSIUM SERPL-SCNC: 3.7 MMOL/L
PROT SERPL-MCNC: 6.7 G/DL
SODIUM SERPL-SCNC: 137 MMOL/L
TTG IGA SER IA-ACNC: <0.5 U/ML
TTG IGA SER-ACNC: NEGATIVE

## 2025-07-18 PROBLEM — A04.8 HELICOBACTER PYLORI (H. PYLORI) INFECTION: Status: ACTIVE | Noted: 2025-07-18

## 2025-07-18 RX ORDER — TETRACYCLINE HYDROCHLORIDE 500 MG/1
500 CAPSULE ORAL
Qty: 56 | Refills: 0 | Status: ACTIVE | COMMUNITY
Start: 2025-07-18 | End: 2025-08-01

## 2025-07-18 RX ORDER — BISMUTH SUBSALICYLATE 262 MG/1
262 TABLET, CHEWABLE ORAL 4 TIMES DAILY
Qty: 112 | Refills: 0 | Status: ACTIVE | COMMUNITY
Start: 2025-07-18 | End: 2025-08-01

## 2025-07-18 RX ORDER — METRONIDAZOLE 250 MG/1
250 TABLET ORAL
Qty: 56 | Refills: 0 | Status: ACTIVE | COMMUNITY
Start: 2025-07-18 | End: 2025-08-01

## 2025-07-18 RX ORDER — OMEPRAZOLE 40 MG/1
40 CAPSULE, DELAYED RELEASE ORAL
Qty: 28 | Refills: 0 | Status: ACTIVE | COMMUNITY
Start: 2025-07-18 | End: 2025-08-01